# Patient Record
Sex: FEMALE | Race: WHITE | Employment: FULL TIME | URBAN - METROPOLITAN AREA
[De-identification: names, ages, dates, MRNs, and addresses within clinical notes are randomized per-mention and may not be internally consistent; named-entity substitution may affect disease eponyms.]

---

## 2018-08-22 ENCOUNTER — HOSPITAL ENCOUNTER (EMERGENCY)
Age: 53
Discharge: HOME | End: 2018-08-22
Attending: EMERGENCY MEDICINE | Admitting: EMERGENCY MEDICINE
Payer: COMMERCIAL

## 2018-08-22 ENCOUNTER — APPOINTMENT (OUTPATIENT)
Dept: GENERAL RADIOLOGY | Age: 53
End: 2018-08-22
Attending: NURSE PRACTITIONER
Payer: COMMERCIAL

## 2018-08-22 VITALS
OXYGEN SATURATION: 97 % | HEIGHT: 67 IN | BODY MASS INDEX: 36.47 KG/M2 | RESPIRATION RATE: 19 BRPM | DIASTOLIC BLOOD PRESSURE: 78 MMHG | HEART RATE: 65 BPM | SYSTOLIC BLOOD PRESSURE: 151 MMHG | WEIGHT: 232.4 LBS | TEMPERATURE: 97.4 F

## 2018-08-22 DIAGNOSIS — R07.9 CHEST PAIN, UNSPECIFIED TYPE: Primary | ICD-10-CM

## 2018-08-22 LAB
ALBUMIN SERPL-MCNC: 3.8 G/DL (ref 3.5–5)
ALBUMIN/GLOB SERPL: 1.2 {RATIO} (ref 1.1–2.2)
ALP SERPL-CCNC: 44 U/L (ref 45–117)
ALT SERPL-CCNC: 17 U/L (ref 12–78)
ANION GAP SERPL CALC-SCNC: 7 MMOL/L (ref 5–15)
AST SERPL-CCNC: 12 U/L (ref 15–37)
ATRIAL RATE: 57 BPM
BASOPHILS # BLD: 0 K/UL (ref 0–0.1)
BASOPHILS NFR BLD: 0 % (ref 0–1)
BILIRUB SERPL-MCNC: 0.3 MG/DL (ref 0.2–1)
BUN SERPL-MCNC: 14 MG/DL (ref 6–20)
BUN/CREAT SERPL: 23 (ref 12–20)
CALCIUM SERPL-MCNC: 8.2 MG/DL (ref 8.5–10.1)
CALCULATED P AXIS, ECG09: 19 DEGREES
CALCULATED R AXIS, ECG10: 23 DEGREES
CALCULATED T AXIS, ECG11: -142 DEGREES
CHLORIDE SERPL-SCNC: 109 MMOL/L (ref 97–108)
CO2 SERPL-SCNC: 26 MMOL/L (ref 21–32)
COMMENT, HOLDF: NORMAL
CREAT SERPL-MCNC: 0.61 MG/DL (ref 0.55–1.02)
DIAGNOSIS, 93000: NORMAL
DIFFERENTIAL METHOD BLD: NORMAL
EOSINOPHIL # BLD: 0.2 K/UL (ref 0–0.4)
EOSINOPHIL NFR BLD: 2 % (ref 0–7)
ERYTHROCYTE [DISTWIDTH] IN BLOOD BY AUTOMATED COUNT: 12.7 % (ref 11.5–14.5)
GLOBULIN SER CALC-MCNC: 3.3 G/DL (ref 2–4)
GLUCOSE SERPL-MCNC: 99 MG/DL (ref 65–100)
HCT VFR BLD AUTO: 41.4 % (ref 35–47)
HGB BLD-MCNC: 13.4 G/DL (ref 11.5–16)
IMM GRANULOCYTES # BLD: 0 K/UL (ref 0–0.04)
IMM GRANULOCYTES NFR BLD AUTO: 0 % (ref 0–0.5)
LYMPHOCYTES # BLD: 2.3 K/UL (ref 0.8–3.5)
LYMPHOCYTES NFR BLD: 33 % (ref 12–49)
MCH RBC QN AUTO: 31.5 PG (ref 26–34)
MCHC RBC AUTO-ENTMCNC: 32.4 G/DL (ref 30–36.5)
MCV RBC AUTO: 97.2 FL (ref 80–99)
MONOCYTES # BLD: 0.5 K/UL (ref 0–1)
MONOCYTES NFR BLD: 7 % (ref 5–13)
NEUTS SEG # BLD: 4.1 K/UL (ref 1.8–8)
NEUTS SEG NFR BLD: 58 % (ref 32–75)
NRBC # BLD: 0 K/UL (ref 0–0.01)
NRBC BLD-RTO: 0 PER 100 WBC
P-R INTERVAL, ECG05: 158 MS
PLATELET # BLD AUTO: 271 K/UL (ref 150–400)
PMV BLD AUTO: 10.3 FL (ref 8.9–12.9)
POTASSIUM SERPL-SCNC: 3.8 MMOL/L (ref 3.5–5.1)
PROT SERPL-MCNC: 7.1 G/DL (ref 6.4–8.2)
Q-T INTERVAL, ECG07: 476 MS
QRS DURATION, ECG06: 138 MS
QTC CALCULATION (BEZET), ECG08: 463 MS
RBC # BLD AUTO: 4.26 M/UL (ref 3.8–5.2)
SAMPLES BEING HELD,HOLD: NORMAL
SODIUM SERPL-SCNC: 142 MMOL/L (ref 136–145)
TROPONIN I SERPL-MCNC: <0.05 NG/ML
TROPONIN I SERPL-MCNC: <0.05 NG/ML
VENTRICULAR RATE, ECG03: 57 BPM
WBC # BLD AUTO: 7.1 K/UL (ref 3.6–11)

## 2018-08-22 PROCEDURE — 74011250637 HC RX REV CODE- 250/637: Performed by: EMERGENCY MEDICINE

## 2018-08-22 PROCEDURE — 85025 COMPLETE CBC W/AUTO DIFF WBC: CPT | Performed by: NURSE PRACTITIONER

## 2018-08-22 PROCEDURE — 93005 ELECTROCARDIOGRAM TRACING: CPT

## 2018-08-22 PROCEDURE — 71046 X-RAY EXAM CHEST 2 VIEWS: CPT

## 2018-08-22 PROCEDURE — 80053 COMPREHEN METABOLIC PANEL: CPT | Performed by: NURSE PRACTITIONER

## 2018-08-22 PROCEDURE — 84484 ASSAY OF TROPONIN QUANT: CPT | Performed by: NURSE PRACTITIONER

## 2018-08-22 PROCEDURE — 99285 EMERGENCY DEPT VISIT HI MDM: CPT

## 2018-08-22 PROCEDURE — 36415 COLL VENOUS BLD VENIPUNCTURE: CPT | Performed by: EMERGENCY MEDICINE

## 2018-08-22 RX ORDER — ACETAMINOPHEN 500 MG
1000 TABLET ORAL
Status: COMPLETED | OUTPATIENT
Start: 2018-08-22 | End: 2018-08-22

## 2018-08-22 RX ORDER — ASPIRIN 325 MG
325 TABLET ORAL
Status: COMPLETED | OUTPATIENT
Start: 2018-08-22 | End: 2018-08-22

## 2018-08-22 RX ADMIN — ACETAMINOPHEN 1000 MG: 500 TABLET, FILM COATED ORAL at 13:45

## 2018-08-22 RX ADMIN — ASPIRIN 325 MG: 325 TABLET ORAL at 12:39

## 2018-08-22 NOTE — ED PROVIDER NOTES
Patient is a 45 y.o. female presenting with chest pain. The history is provided by the patient. Chest Pain    This is a new problem. The current episode started less than 1 hour ago. The problem has not changed since onset. Duration of episode(s) is 30 minutes. The problem occurs constantly. The pain is associated with rest. The pain is moderate. The quality of the pain is described as pressure-like. The pain radiates to the left jaw and left neck. Exacerbated by: nothing. Associated symptoms include nausea. Pertinent negatives include no abdominal pain, no cough, no diaphoresis, no dizziness, no exertional chest pressure, no fever, no lower extremity edema, no malaise/fatigue, no palpitations and no vomiting. She has tried nothing for the symptoms. Risk factors include no risk factors. Her past medical history does not include DM or HTN. Procedural history includes exercise treadmill test (multiple years ago for \"irregular heart beat\"). Past Medical History:   Diagnosis Date    Hypothyroid        Past Surgical History:   Procedure Laterality Date    HX APPENDECTOMY      HX GYN          HX TONSILLECTOMY           History reviewed. No pertinent family history. Social History     Social History    Marital status: SINGLE     Spouse name: N/A    Number of children: N/A    Years of education: N/A     Occupational History    Not on file. Social History Main Topics    Smoking status: Current Every Day Smoker     Packs/day: 0.25    Smokeless tobacco: Never Used    Alcohol use Yes      Comment: socially    Drug use: No    Sexual activity: Not on file     Other Topics Concern    Not on file     Social History Narrative    No narrative on file         ALLERGIES: Review of patient's allergies indicates no known allergies. Review of Systems   Constitutional: Negative for diaphoresis, fever and malaise/fatigue. Respiratory: Negative for cough. Cardiovascular: Positive for chest pain. Negative for palpitations. Gastrointestinal: Positive for nausea. Negative for abdominal pain and vomiting. Neurological: Negative for dizziness. All other systems reviewed and are negative. Vitals:    08/22/18 1034 08/22/18 1230 08/22/18 1300   BP: (!) 158/91 125/61 137/76   Pulse: 72 (!) 59 (!) 58   Resp: 16 13 17   Temp: 97.4 °F (36.3 °C)     SpO2: 98% 100% 97%   Weight: 105.4 kg (232 lb 6.4 oz)     Height: 5' 7\" (1.702 m)              Physical Exam   Constitutional: She appears well-developed and well-nourished. No distress. HENT:   Head: Normocephalic and atraumatic. Eyes: Conjunctivae are normal.   Neck: Neck supple. Cardiovascular: Normal rate, regular rhythm and normal heart sounds. Exam reveals no gallop and no friction rub. No murmur heard. Pulmonary/Chest: Effort normal. No stridor. No respiratory distress. Abdominal: She exhibits no distension. There is no tenderness. There is no guarding. Musculoskeletal: Normal range of motion. Neurological: She is alert. Coordination normal.   Skin: Skin is warm and dry. Psychiatric: She has a normal mood and affect. Nursing note and vitals reviewed. MDM    45 y.o. female presents with chest pressure radiating to left jaw and neck starting 30 minutes prior to arrival in ED. Discussed EKG findings in triage area with nonspecific repolarization abnormalities and IVCD with her profile of typical chest pain is high risk for ACS especially occurring at rest. First troponin negative, ASA given for empiric therapy. Pt states she had stress test previously for \"irregular heart beat\" but does not remember where as it was many years ago. D/t recent onset of pain will need serial troponins and I recommended she be admitted to the hospital for further workup and management. Pt did not want to be admitted and is requesting to be discharged.  I consulted cardiology to discuss case and possible close follow up or have discussion with patient regarding care options. 2:15 PM Page resent to cardiology after no answer, second troponin ordered for 14:30.     2:39 PM I am told Dr Ernie Thornton is coming to see patient but have not had the opportunity to discuss the case. Second troponin pending, symptoms are improved. Disposition will be per cardiology recommendations. I have offered the patient admission and urged her to stay for workup. If her next troponin is positive, she will leave against medical advice if she decides to and we discussed this. She currently has capacity to make decisions, is sober, and is aware of risks of worsening pain, death or permanent disability if workup is not completed. Care transferred to Dr Issac Sheikh pending serial troponin result and cardiology consult. ED Course       Procedures    EKG 11:22: Rate 57, there are no previous tracings available for comparison, nonspecific ST and T waves changes, sinus bradycardia, LBBB variant. EKG 11:30: Rate 59 sinus bradycardia, unchanged from previous tracings, nonspecific ST and T wave abnormality, LBBB varant.

## 2018-08-22 NOTE — ED NOTES
10:31 AM  I have evaluated the patient as the Provider in Triage. I have reviewed Her vital signs and the triage nurse assessment. I have talked with the patient and any available family and advised that I am the provider in triage and have ordered the appropriate study to initiate their work up based on the clinical presentation during my assessment. I have advised that the patient will be accommodated in the Main ED as soon as possible. I have also requested to contact the triage nurse or myself immediately if the patient experiences any changes in their condition during this brief waiting period. CC: Chest pain    Substernal with radiation to neck and back. Started about 30 minutes ago. On HRT. Denies recent travel, leg swelling, nausea, vomiting, diarrhea or constipation    Tobacco: 2 -3 packs per week. Alcohol twice weekly    Chest pain protocol initiated.        Priyanka Valerio, NP

## 2018-08-22 NOTE — ED NOTES
Report given to [facility] KATE BRADSHAW using [employer]. Awaiting dispo by physician.

## 2018-08-22 NOTE — ED NOTES
1545: Assumed care of pt. Report received from Kamala, [facility]. Pt asking how much longer it will take until she can leave, pt ambulatory to restroom.

## 2018-08-22 NOTE — ED TRIAGE NOTES
Pt c/o midsternal CP radiating into jaw and back x 30 minutes, denies SOB, N/V. Denies recent long travel.

## 2018-08-22 NOTE — ED NOTES
Change of shift. Care of patient taken over from Dr. Vianey Chou; H&P reviewed, bedside handoff complete. Awaiting cardiology consult and 2nd troponin results. Note written by Marilynn Watson, as dictated by Ashleigh Castellano MD 2:20 PM    PROGRESS NOTE:  4:30 PM  Ashleigh Castellano MD attempted to discuss results, discharge paperwork, and referrals including cardiology, but had Pt walked out. PROGRESS NOTE:  4:39 PM  Pt returned to the ED to look for her phone. Dr. Alexandra Yen stressed to Pt she should stay for cardiology evaluation and recommended admission to the hospital d/t risk factors. Pt refuses to stay and wants to go home. The patient's results have been reviewed with them and/or available family. Patient and/or family verbally conveyed their understanding and agreement of the patient's signs, symptoms, diagnosis, treatment and prognosis and additionally agree to follow up as recommended in the discharge instructions or to return to the Emergency Room should their condition change prior to their follow-up appointment. The patient/family verbally agrees with the care-plan and verbally conveys that all of their questions have been answered. The discharge instructions have also been provided to the patient and/or family with some educational information regarding the patient's diagnosis as well a list of reasons why the patient would want to return to the ER prior to their follow-up appointment, should their condition change.

## 2020-07-15 PROBLEM — H60.503 ACUTE OTITIS EXTERNA OF BOTH EARS: Status: ACTIVE | Noted: 2020-07-15

## 2020-07-15 RX ORDER — METFORMIN HYDROCHLORIDE 500 MG/1
1000 TABLET ORAL DAILY
COMMUNITY

## 2020-07-15 RX ORDER — TITANIUM DIOXIDE/OXYBEN/CINOX
LOTION (ML) TOPICAL
COMMUNITY

## 2020-07-15 RX ORDER — ESTRADIOL AND NORETHINDRONE ACETATE .5; .1 MG/1; MG/1
1 TABLET ORAL DAILY
COMMUNITY
End: 2022-01-11

## 2020-07-15 RX ORDER — LIOTHYRONINE SODIUM 5 UG/1
5 TABLET ORAL DAILY
COMMUNITY

## 2020-07-15 RX ORDER — LEVOTHYROXINE SODIUM 112 UG/1
TABLET ORAL
COMMUNITY
End: 2022-01-11 | Stop reason: ALTCHOICE

## 2020-07-15 RX ORDER — FLUCONAZOLE 150 MG/1
150 TABLET ORAL DAILY
COMMUNITY
End: 2022-01-11 | Stop reason: ALTCHOICE

## 2020-07-20 VITALS
SYSTOLIC BLOOD PRESSURE: 128 MMHG | HEART RATE: 78 BPM | BODY MASS INDEX: 34.37 KG/M2 | HEIGHT: 67 IN | WEIGHT: 219 LBS | TEMPERATURE: 97.9 F | DIASTOLIC BLOOD PRESSURE: 84 MMHG | OXYGEN SATURATION: 97 %

## 2020-07-24 ENCOUNTER — VIRTUAL VISIT (OUTPATIENT)
Dept: PRIMARY CARE CLINIC | Age: 55
End: 2020-07-24
Payer: COMMERCIAL

## 2020-07-24 DIAGNOSIS — C51.9 VULVAR CANCER (HCC): ICD-10-CM

## 2020-07-24 DIAGNOSIS — L98.9 PRECANCEROUS SKIN LESION: Primary | ICD-10-CM

## 2020-07-24 DIAGNOSIS — E06.3 HYPOTHYROIDISM DUE TO HASHIMOTO'S THYROIDITIS: ICD-10-CM

## 2020-07-24 DIAGNOSIS — E11.9 TYPE 2 DIABETES MELLITUS WITHOUT COMPLICATION, WITHOUT LONG-TERM CURRENT USE OF INSULIN (HCC): ICD-10-CM

## 2020-07-24 DIAGNOSIS — E03.8 HYPOTHYROIDISM DUE TO HASHIMOTO'S THYROIDITIS: ICD-10-CM

## 2020-07-24 DIAGNOSIS — M79.672 LEFT FOOT PAIN: ICD-10-CM

## 2020-07-24 PROCEDURE — 99213 OFFICE O/P EST LOW 20 MIN: CPT | Performed by: FAMILY MEDICINE

## 2020-07-24 RX ORDER — ERGOCALCIFEROL 1.25 MG/1
CAPSULE ORAL
COMMUNITY
Start: 2020-05-12

## 2020-07-24 NOTE — PROGRESS NOTES
HISTORY OF PRESENT ILLNESS    THIS IS  VIDEO VISIT PERFORMED THRU THE SECURE WEBSITE [employer].     Left foot discomfort X 2 weeks. Context:  No trauma recalled or similar problem  Worse with:walking, getting up from sitting  Improves with sitting  Treatments tried: nothing  Relevant history: no flat feet      Pt states needing a referral to dermatologysue,Pt states needing a referral to dermasue,    Endo is Dr Clark Henriquez - sees for  dm and hashimotos with Hypothyroid. Dermatology - DR Sonja García annual skin  Exam, hx of precancer. Oncology - Venita Chase - vulvar cancer, ongoing care. Seen q 6 months. Sees him for paps as well. Samm Santana is a 62 y.o. female. Past Medical History:   Diagnosis Date    Allergies     Cardiac arrhythmia     Diabetes ([address]     Hypothyroid      Social History     Tobacco Use    Smoking status: Current Every Day Smoker     Packs/day: 0.25    Smokeless tobacco: Never Used   Substance Use Topics    Alcohol use: Yes     Comment: socially    Drug use: No       Family History   Problem Relation Age of Onset    Anemia Other     Depression Other     Hypertension Other     Alcohol abuse Other     Diabetes Other     Heart Disease Other        Review of Systems   Constitutional: Negative for chills, fever, malaise/fatigue and weight loss. Respiratory: Negative for cough and shortness of breath. Cardiovascular: Negative for chest pain, palpitations, orthopnea and leg swelling. Gastrointestinal: Negative for abdominal pain, heartburn and nausea. Musculoskeletal: Positive for myalgias. Negative for back pain and joint pain. Skin: Negative for itching and rash. Neurological: Negative for dizziness, tingling, tremors, sensory change, weakness and headaches. Endo/Heme/Allergies: Does not bruise/bleed easily. Psychiatric/Behavioral: Negative for depression. The patient is not nervous/anxious and does not have insomnia.              Physical Exam  Constitutional:       Appearance: Normal appearance. She is obese. HENT:      Head: Normocephalic and atraumatic. Eyes:      Extraocular Movements: Extraocular movements intact. Conjunctiva/sclera: Conjunctivae normal.   Pulmonary:      Effort: Pulmonary effort is normal. No respiratory distress. Abdominal:      Palpations: Abdomen is soft. Neurological:      General: No focal deficit present. Mental Status: She is alert and oriented to person, place, and time. Psychiatric:         Mood and Affect: Mood normal.         Behavior: Behavior normal.         Thought Content: Thought content normal.       ASSESSMENT and PLAN  Diagnoses and all orders for this visit:    1. Precancerous skin lesion  -     REFERRAL TO DERMATOLOGY    2. Hypothyroidism due to Hashimoto's thyroiditis  -     REFERRAL TO ENDOCRINOLOGY    3. Type 2 diabetes mellitus without complication, without long-term current use of insulin (HCC)  -     REFERRAL TO ENDOCRINOLOGY    4. Vulvar cancer ([facility])  -     REFERRAL TO ONCOLOGY    5. Left foot pain  Comments:  sounds like plantar facittis - ice and strtches of achilles. orthodics discssed and shoes with appropriate arch support. no flip flops. Orders Placed This Encounter    REFERRAL TO ENDOCRINOLOGY    REFERRAL TO DERMATOLOGY    REFERRAL TO ONCOLOGY    ergocalciferol (ERGOCALCIFEROL) 1,250 mcg (50,000 unit) capsule     Follow-up and Dispositions    · Return if symptoms worsen or fail to improve.

## 2020-07-28 ENCOUNTER — TELEPHONE (OUTPATIENT)
Dept: PRIMARY CARE CLINIC | Age: 55
End: 2020-07-28

## 2020-08-09 PROBLEM — L98.9 PRECANCEROUS SKIN LESION: Status: ACTIVE | Noted: 2020-08-09

## 2020-08-09 PROBLEM — E11.9 TYPE 2 DIABETES MELLITUS WITHOUT COMPLICATION, WITHOUT LONG-TERM CURRENT USE OF INSULIN (HCC): Status: ACTIVE | Noted: 2020-08-09

## 2020-08-09 PROBLEM — E06.3 HYPOTHYROIDISM DUE TO HASHIMOTO'S THYROIDITIS: Status: ACTIVE | Noted: 2020-08-09

## 2020-08-09 PROBLEM — C51.9 VULVAR CANCER (HCC): Status: ACTIVE | Noted: 2020-08-09

## 2020-08-09 PROBLEM — E03.8 HYPOTHYROIDISM DUE TO HASHIMOTO'S THYROIDITIS: Status: ACTIVE | Noted: 2020-08-09

## 2020-10-07 ENCOUNTER — TELEPHONE (OUTPATIENT)
Dept: PRIMARY CARE CLINIC | Age: 55
End: 2020-10-07

## 2020-10-07 NOTE — TELEPHONE ENCOUNTER
Patient called today 10/7/2020 and stated that she is having a hard time getting a referral to her dermatologist Dr. Nish Mendoza and wants someone to call her asap today because she has had to move her appt again to the 19th at 4:30 pm. The # to reach her at is [phone].

## 2020-11-20 ENCOUNTER — TELEPHONE (OUTPATIENT)
Dept: PRIMARY CARE CLINIC | Age: 55
End: 2020-11-20

## 2020-11-20 NOTE — TELEPHONE ENCOUNTER
So I called the insurance company and the state they have no record of us send any referrals. Im not the referral person but I do recall Anil Wang and Ifeanyi Quintanilla working on these are talking to [employer] and even faxing over something to them and they state they have no record. I already ask if it could be back dated and they stated no nothing can be done. Now I have to call this patient and info her that it is what it is.  Is there anything that could be done because we have been going back and forth with this insurance company

## 2020-11-20 NOTE — TELEPHONE ENCOUNTER
Patient would like you to call her about her needing referral's to all of her doctor's and now she has gotten a bill from her dermatologist because she did not have one. She can be reached at [phone].

## 2020-11-20 NOTE — TELEPHONE ENCOUNTER
So I spoke to [address] [place] [address] [id number] reference number. The only referral they have is the one for Dr. Alirio Chatman, which is werid because we did them both on the same day. Szymanski Pascale states to do them on paper and write the date that she saw the Derm doctor and they will be able to back date it. Dr. Alirio Chatman got approve for 4 visit. Ms. Cam Velasquez saw Derm doctor on. .. Milad Sanchez   Called and pt and not able to leave a vm

## 2020-11-25 ENCOUNTER — TELEPHONE (OUTPATIENT)
Dept: PRIMARY CARE CLINIC | Age: 55
End: 2020-11-25

## 2020-11-25 NOTE — TELEPHONE ENCOUNTER
Called again today  Reference number [id number]  They receive all of the referrals. They receieve Dr. Davide Blecher referral on 10/9/2020 prior to her appt on 10/19/2020. They did something wrong on there end and it is being reprocessed. 13-15 days it can take.      Dr. TELLEZ [facility] approved from 11/15-5/14/2021 6 visits    Dr. Urban Caballero- they receive two referrals one from 7/2020- 1/2020 with 4 visits and 11/15-5/14= 6 visit       Left message to call me back so I can explain to her

## 2020-12-09 ENCOUNTER — TELEPHONE (OUTPATIENT)
Dept: PRIMARY CARE CLINIC | Age: 55
End: 2020-12-09

## 2020-12-09 NOTE — TELEPHONE ENCOUNTER
Spoke to [employer] again. They stated they over turned the claim and patient responsibility was only the copy.  Pt informed and reference number is [id number]

## 2021-01-22 ENCOUNTER — TELEPHONE (OUTPATIENT)
Dept: PRIMARY CARE CLINIC | Age: 56
End: 2021-01-22

## 2021-01-22 DIAGNOSIS — E03.8 HYPOTHYROIDISM DUE TO HASHIMOTO'S THYROIDITIS: ICD-10-CM

## 2021-01-22 DIAGNOSIS — E06.3 HYPOTHYROIDISM DUE TO HASHIMOTO'S THYROIDITIS: ICD-10-CM

## 2021-01-22 DIAGNOSIS — E11.9 TYPE 2 DIABETES MELLITUS WITHOUT COMPLICATION, WITHOUT LONG-TERM CURRENT USE OF INSULIN (HCC): Primary | ICD-10-CM

## 2021-01-22 NOTE — TELEPHONE ENCOUNTER
Saman is Dr Leona Duenas - sees for  dm and hashimotos with Hypothyroid. Last referal done 7/2020. She needs to be given a 1 yr referal .   OK to redo. Order put in.

## 2022-01-11 ENCOUNTER — OFFICE VISIT (OUTPATIENT)
Dept: FAMILY MEDICINE CLINIC | Age: 57
End: 2022-01-11
Payer: COMMERCIAL

## 2022-01-11 VITALS
TEMPERATURE: 97.3 F | OXYGEN SATURATION: 97 % | HEIGHT: 67 IN | WEIGHT: 220 LBS | DIASTOLIC BLOOD PRESSURE: 78 MMHG | HEART RATE: 69 BPM | BODY MASS INDEX: 34.53 KG/M2 | SYSTOLIC BLOOD PRESSURE: 122 MMHG

## 2022-01-11 DIAGNOSIS — E11.9 TYPE 2 DIABETES MELLITUS WITHOUT COMPLICATION, WITHOUT LONG-TERM CURRENT USE OF INSULIN (HCC): Primary | ICD-10-CM

## 2022-01-11 DIAGNOSIS — E55.9 VITAMIN D DEFICIENCY: ICD-10-CM

## 2022-01-11 DIAGNOSIS — Z11.59 SCREENING FOR VIRAL DISEASE: ICD-10-CM

## 2022-01-11 DIAGNOSIS — Z23 ENCOUNTER FOR IMMUNIZATION: ICD-10-CM

## 2022-01-11 DIAGNOSIS — Z87.412 PERSONAL HISTORY OF VULVAR DYSPLASIA: ICD-10-CM

## 2022-01-11 DIAGNOSIS — E06.3 HYPOTHYROIDISM DUE TO HASHIMOTO'S THYROIDITIS: ICD-10-CM

## 2022-01-11 DIAGNOSIS — E03.8 HYPOTHYROIDISM DUE TO HASHIMOTO'S THYROIDITIS: ICD-10-CM

## 2022-01-11 PROBLEM — D50.9 IRON DEFICIENCY ANEMIA: Status: ACTIVE | Noted: 2022-01-11

## 2022-01-11 PROCEDURE — 99214 OFFICE O/P EST MOD 30 MIN: CPT | Performed by: FAMILY MEDICINE

## 2022-01-11 PROCEDURE — 90750 HZV VACC RECOMBINANT IM: CPT | Performed by: FAMILY MEDICINE

## 2022-01-11 PROCEDURE — 90471 IMMUNIZATION ADMIN: CPT | Performed by: FAMILY MEDICINE

## 2022-01-11 RX ORDER — ESTRADIOL AND NORETHINDRONE ACETATE 1; .5 MG/1; MG/1
1 TABLET, FILM COATED ORAL DAILY
COMMUNITY
Start: 2021-10-13

## 2022-01-11 RX ORDER — LEVOTHYROXINE SODIUM 112 UG/1
112 TABLET ORAL
COMMUNITY

## 2022-01-11 NOTE — PROGRESS NOTES
Chief Complaint   Patient presents with    New Patient     To get established patient used to see Dr. Kevin Brewer    1. Have you been to the ER, urgent care clinic since your last visit? Hospitalized since your last visit? No    2. Have you seen or consulted any other health care providers outside of the [address] [place] [address] since your last visit? Include any pap smears or colon screening.  No       3 most recent PHQ Screens 1/11/2022   Little interest or pleasure in doing things Not at all   Feeling down, depressed, irritable, or hopeless Not at all   Total Score PHQ 2 0

## 2022-01-31 ENCOUNTER — TELEPHONE (OUTPATIENT)
Dept: FAMILY MEDICINE CLINIC | Age: 57
End: 2022-01-31

## 2022-03-18 PROBLEM — D50.9 IRON DEFICIENCY ANEMIA: Status: ACTIVE | Noted: 2022-01-11

## 2022-03-18 PROBLEM — Z87.412 PERSONAL HISTORY OF VULVAR DYSPLASIA: Status: ACTIVE | Noted: 2020-08-09

## 2022-03-19 PROBLEM — E03.8 HYPOTHYROIDISM DUE TO HASHIMOTO'S THYROIDITIS: Status: ACTIVE | Noted: 2020-08-09

## 2022-03-19 PROBLEM — E11.9 TYPE 2 DIABETES MELLITUS WITHOUT COMPLICATION, WITHOUT LONG-TERM CURRENT USE OF INSULIN (HCC): Status: ACTIVE | Noted: 2020-08-09

## 2022-03-19 PROBLEM — E55.9 VITAMIN D DEFICIENCY: Status: ACTIVE | Noted: 2022-01-11

## 2022-03-19 PROBLEM — E06.3 HYPOTHYROIDISM DUE TO HASHIMOTO'S THYROIDITIS: Status: ACTIVE | Noted: 2020-08-09

## 2022-06-03 ENCOUNTER — TELEPHONE (OUTPATIENT)
Dept: FAMILY MEDICINE CLINIC | Age: 57
End: 2022-06-03

## 2022-06-03 NOTE — TELEPHONE ENCOUNTER
----- Message from Carolina Zayas MD sent at 1/11/2022  2:51 PM EST -----  Please reach out to Dr. Jessy Pal office ([facility]) for notes and labs from 1/22 if not already received.

## 2022-06-10 ENCOUNTER — TELEPHONE (OUTPATIENT)
Dept: FAMILY MEDICINE CLINIC | Age: 57
End: 2022-06-10

## 2022-06-10 NOTE — TELEPHONE ENCOUNTER
Spoke with patient, endocrinology office and [facility]. Sent information to Butler at endocrinology office. See scanned documentation and notes in order.

## 2022-06-10 NOTE — TELEPHONE ENCOUNTER
----- Message from Hayden Jimenez sent at 6/10/2022  8:41 AM EDT -----  Subject: Message to Provider    QUESTIONS  Information for Provider? Patient is calling regarding a referral that was   supposed to be sent to Dr. Vidal Morales office, at the [facility], that they told her was never sent, and now she has a   bill, due to them not receiving the referral. She is requesting a call   back, to see if this referral can be resent, so that this bill can be   fixed. ---------------------------------------------------------------------------  --------------  Emmanuel Burton INFO  What is the best way for the office to contact you? OK to leave message on   voicemail  Preferred Call Back Phone Number? [phone]  ---------------------------------------------------------------------------  --------------  SCRIPT ANSWERS  Relationship to Patient?  Self

## 2022-07-01 ENCOUNTER — TELEPHONE (OUTPATIENT)
Dept: FAMILY MEDICINE CLINIC | Age: 57
End: 2022-07-01

## 2022-07-01 NOTE — TELEPHONE ENCOUNTER
----- Message from Xiomy Sykes sent at 7/1/2022  3:43 PM EDT -----  Subject: Message to Provider    QUESTIONS  Information for Provider? Patient would like for nurse/provider to please   contact in regards to referral that has been rejected by insurance   reference # [id number] for endocrinology. Please contact patient to assist   with matter immediately, she stated she is being charge $160 for non   authorization of specialty services.   ---------------------------------------------------------------------------  --------------  [address] [employer] [facility]  [phone]; OK to leave message on voicemail  ---------------------------------------------------------------------------  --------------  SCRIPT ANSWERS  Relationship to Patient?  Self

## 2022-07-11 NOTE — TELEPHONE ENCOUNTER
I left a basic explanation on her voicemail, she had a identifier. Letting her know that Hugh Clinton from endocrinology is aware to submit this information to [facility] so they can apply it to that visit. Patient was seen 01/31/22 by Dr. Radha Love (endo) at [facility] office. Asked for notes to be sent to us.     Also sent Hugh Clinton in billing the paper order again. The claim was denied because [facility] did not have the paper copy of the referral order, not an authorization. Hugh Clinton needs to fax them ([phone] Attn: claims) the order and the claim number so they can apply it to that visit. Ref #(last four) [id number], spoke with Leila Montelongo at [facility].      [facility] fax [phone].   Office fax [phone]  Office ph [phone]